# Patient Record
Sex: FEMALE | Race: BLACK OR AFRICAN AMERICAN | NOT HISPANIC OR LATINO | Employment: FULL TIME | URBAN - METROPOLITAN AREA
[De-identification: names, ages, dates, MRNs, and addresses within clinical notes are randomized per-mention and may not be internally consistent; named-entity substitution may affect disease eponyms.]

---

## 2021-06-03 ENCOUNTER — RECORDS - HEALTHEAST (OUTPATIENT)
Dept: ADMINISTRATIVE | Facility: CLINIC | Age: 36
End: 2021-06-03

## 2022-04-20 ENCOUNTER — APPOINTMENT (OUTPATIENT)
Dept: ULTRASOUND IMAGING | Facility: HOSPITAL | Age: 37
End: 2022-04-20
Attending: EMERGENCY MEDICINE
Payer: COMMERCIAL

## 2022-04-20 ENCOUNTER — HOSPITAL ENCOUNTER (EMERGENCY)
Facility: HOSPITAL | Age: 37
Discharge: HOME | End: 2022-04-20
Attending: EMERGENCY MEDICINE | Admitting: EMERGENCY MEDICINE
Payer: COMMERCIAL

## 2022-04-20 ENCOUNTER — APPOINTMENT (OUTPATIENT)
Dept: CT IMAGING | Facility: HOSPITAL | Age: 37
End: 2022-04-20
Attending: EMERGENCY MEDICINE
Payer: COMMERCIAL

## 2022-04-20 VITALS
DIASTOLIC BLOOD PRESSURE: 70 MMHG | WEIGHT: 293 LBS | HEART RATE: 74 BPM | HEIGHT: 68 IN | TEMPERATURE: 99.7 F | BODY MASS INDEX: 44.41 KG/M2 | SYSTOLIC BLOOD PRESSURE: 132 MMHG | RESPIRATION RATE: 18 BRPM | OXYGEN SATURATION: 100 %

## 2022-04-20 DIAGNOSIS — H53.8 BLURRED VISION: ICD-10-CM

## 2022-04-20 DIAGNOSIS — R51.9 ACUTE NONINTRACTABLE HEADACHE, UNSPECIFIED HEADACHE TYPE: ICD-10-CM

## 2022-04-20 LAB
ALBUMIN SERPL-MCNC: 3.6 G/DL (ref 3.5–5)
ALP SERPL-CCNC: 84 U/L (ref 45–120)
ALT SERPL W P-5'-P-CCNC: 13 U/L (ref 0–45)
ANION GAP SERPL CALCULATED.3IONS-SCNC: 7 MMOL/L (ref 5–18)
APTT PPP: 22 SECONDS (ref 22–38)
AST SERPL W P-5'-P-CCNC: 16 U/L (ref 0–40)
BILIRUB DIRECT SERPL-MCNC: 0.1 MG/DL
BILIRUB SERPL-MCNC: 0.2 MG/DL (ref 0–1)
BUN SERPL-MCNC: 8 MG/DL (ref 8–22)
CALCIUM SERPL-MCNC: 8.9 MG/DL (ref 8.5–10.5)
CHLORIDE BLD-SCNC: 103 MMOL/L (ref 98–107)
CO2 SERPL-SCNC: 27 MMOL/L (ref 22–31)
CREAT SERPL-MCNC: 0.75 MG/DL (ref 0.6–1.1)
ERYTHROCYTE [DISTWIDTH] IN BLOOD BY AUTOMATED COUNT: 14.7 % (ref 10–15)
GFR SERPL CREATININE-BSD FRML MDRD: >90 ML/MIN/1.73M2
GLUCOSE BLD-MCNC: 134 MG/DL (ref 70–125)
HCG SERPL QL: NEGATIVE
HCT VFR BLD AUTO: 38.3 % (ref 35–47)
HGB BLD-MCNC: 12.5 G/DL (ref 11.7–15.7)
INR PPP: 1.07 (ref 0.85–1.15)
MCH RBC QN AUTO: 29.7 PG (ref 26.5–33)
MCHC RBC AUTO-ENTMCNC: 32.6 G/DL (ref 31.5–36.5)
MCV RBC AUTO: 91 FL (ref 78–100)
PLATELET # BLD AUTO: 286 10E3/UL (ref 150–450)
POTASSIUM BLD-SCNC: 3.7 MMOL/L (ref 3.5–5)
PROT SERPL-MCNC: 7.2 G/DL (ref 6–8)
RBC # BLD AUTO: 4.21 10E6/UL (ref 3.8–5.2)
SODIUM SERPL-SCNC: 137 MMOL/L (ref 136–145)
WBC # BLD AUTO: 6.4 10E3/UL (ref 4–11)

## 2022-04-20 PROCEDURE — 85027 COMPLETE CBC AUTOMATED: CPT | Performed by: EMERGENCY MEDICINE

## 2022-04-20 PROCEDURE — 76856 US EXAM PELVIC COMPLETE: CPT

## 2022-04-20 PROCEDURE — 36415 COLL VENOUS BLD VENIPUNCTURE: CPT | Performed by: EMERGENCY MEDICINE

## 2022-04-20 PROCEDURE — 250N000013 HC RX MED GY IP 250 OP 250 PS 637: Performed by: EMERGENCY MEDICINE

## 2022-04-20 PROCEDURE — 99285 EMERGENCY DEPT VISIT HI MDM: CPT | Mod: 25

## 2022-04-20 PROCEDURE — 80053 COMPREHEN METABOLIC PANEL: CPT | Performed by: EMERGENCY MEDICINE

## 2022-04-20 PROCEDURE — 84703 CHORIONIC GONADOTROPIN ASSAY: CPT | Performed by: EMERGENCY MEDICINE

## 2022-04-20 PROCEDURE — 85610 PROTHROMBIN TIME: CPT | Performed by: EMERGENCY MEDICINE

## 2022-04-20 PROCEDURE — 82248 BILIRUBIN DIRECT: CPT | Performed by: EMERGENCY MEDICINE

## 2022-04-20 PROCEDURE — 70450 CT HEAD/BRAIN W/O DYE: CPT

## 2022-04-20 PROCEDURE — 93005 ELECTROCARDIOGRAM TRACING: CPT | Performed by: EMERGENCY MEDICINE

## 2022-04-20 PROCEDURE — 85730 THROMBOPLASTIN TIME PARTIAL: CPT | Performed by: EMERGENCY MEDICINE

## 2022-04-20 RX ORDER — ACETAMINOPHEN 325 MG/1
650 TABLET ORAL ONCE
Status: COMPLETED | OUTPATIENT
Start: 2022-04-20 | End: 2022-04-20

## 2022-04-20 RX ADMIN — ACETAMINOPHEN 650 MG: 325 TABLET ORAL at 22:30

## 2022-04-20 NOTE — ED PROVIDER NOTES
"ED Provider In Triage Note  [facility]  Encounter Date: Apr 20, 2022    Chief Complaint   Patient presents with     Headache/blurred vision/hypertension       Brief HPI:   Milady Andre is a 37 year old female presenting to the Emergency Department with a chief complaint of headaches with BL blurred vision associated with hypertension. She had intermittent headaches yesterday that worsened today. No nausea or vomiting. No chest pain or SOB.     She also reports heavy vaginal bleeding; she had to change her pad 3-4 times this morning before leaving for work. She reports irregular menses. Reports cramping pain, stating \"I feel like I'm carrying around a baby\".     Brief Physical Exam:  BP (!) 165/79 (BP Location: Left arm, Patient Position: Sitting)   Pulse 70   Temp 99.6  F (37.6  C) (Oral)   Resp 18   Ht 1.727 m (5' 8\")   Wt 140.6 kg (310 lb)   LMP  (LMP Unknown)   SpO2 100%   BMI 47.14 kg/m    General: Non-toxic appearing  HEENT: Atraumatic; PERRL; EOMI  Resp: No respiratory distress; clear lungs  Cardiac: RRR  Abdomen: soft, non-tender  Neuro: Alert, oriented, answers questions appropriately; cranial nerves grossly intact, strength 5/5 BL upper and lower extremities with sensation to light touch grossly intact  Psych: Behavior appropriate      Plan Initiated in Triage:  EKG  Blood work  Urine studies  Head CT  Pelvic ultrasound    PIT Dispo:   Room when able    Milagros Sanchez MD on 4/20/2022 at 6:48 PM    Patient was evaluated by the Physician in Triage due to a limitation of available rooms in the Emergency Department. A plan of care was discussed based on the information obtained on the initial evaluation and patient was consuled to return back to the Emergency Department lobby after this initial evalutaiton until results were obtained or a room became available in the Emergency Department. Patient was counseled not to leave prior to receiving the results of their " workup.        Milagros Sanchez MD  04/20/22 0422

## 2022-04-20 NOTE — ED TRIAGE NOTES
Patient comes to ED for evaluation of headache and intermittent blurred vision. Onset was yesterday but headache more severe today. Also stated was hypertensive at work and had had heavy vaginal bleeding. Irregular menses. Stated soake 4 pads this am before going to work.

## 2022-04-21 NOTE — ED NOTES
Pt. Reports checking her BP at work today. She states that she has never has issues w/elevated BP, nor was she ever prescribed any meds to control her BP. Pt. states that her periods are very irregular and she soaked 4 pads this morning after which she felt lightheaded and started to have a headache (4/10 currently).

## 2022-04-21 NOTE — DISCHARGE INSTRUCTIONS
You were seen today in the emergency department at [facility] for headache, blurred vision, abdominal pain, and heavy periods.  Your evaluation today looked generally stable and reassuring.  Your head CT looks good overall with no signs of bleeding or stroke.  Because of your headache, and blurred vision, 1 thing we were considering was a disease called idiopathic intracranial hypertension.  We are less convinced of this diagnosis based on your stable exam and improving symptoms and we recommended that you follow-up quickly and neurology clinic to continue your evaluation and review any ongoing symptoms.  If you have any severe or recurrent symptoms particularly sustained blurry vision, weakness or numbness in your arms or legs, difficulty walking, confusion, etc. we should reevaluate you right away in the emergency department.  Otherwise we placed an urgent referral so a  should be reaching out to you soon to help make the neurology clinic follow-up appointment.  Please also review this visit with your primary doctor and try to get your blood pressure rechecked in clinic within the next couple of weeks.

## 2022-04-21 NOTE — ED PROVIDER NOTES
EMERGENCY DEPARTMENT ENCOUNTER      NAME: Milady Andre  AGE: 37 year old female  YOB: 1985  MRN: [id number]  EVALUATION DATE & TIME: 4/20/2022 10:21 PM    PCP: No Ref-Primary, Physician    ED PROVIDER: Selvin Browne M.D.      Chief Complaint   Patient presents with     Headache/blurred vision/hypertension       FINAL IMPRESSION:  1. Acute nonintractable headache, unspecified headache type    2. Blurred vision        ED COURSE & MEDICAL DECISION MAKING:    10:35 PM I met with the patient, obtained history, performed an initial exam, and discussed options and plan for diagnostics and treatment here in the ED. PPE worn: N95 mask and gloves.   11:00 PM Results were communicated with the patient. Discussed discharge plans along with return precautions. Patient was agreeable with plan.        37 year old female presents to the Emergency Department for evaluation of headache, blurred vision, elevated blood pressure, abdominal pain, heavy periods.  She is vitally stable and well-appearing when she arrives to the emergency department.  Her full neurological exam is unremarkable.  Cardiopulmonary exam and abdominal exams are reassuring as well.  She had gradual onset of headache accompanied by some blurred vision earlier which is now improved.  Neurological exam is normal.  Head CT negative for acute intracranial process.  There was a partially empty sella.  Considered idiopathic intracranial hypertension.  I think given that her blurred vision is now completely resolved, her neurological exam is otherwise unremarkable, I do not have a high suspicion for this illness.  patient was not at all interested in pursuing a lumbar puncture here in the emergency department when this was discussed with her.  I think given her relatively mild headache currently rated 4 out of 10, resolved blurry vision, she could follow-up with any ongoing concerns with a urgent neurology referral and strict ED return  "precautions.    Patient's labs including hemoglobin, white blood cell count, metabolic profile all look stable and reassuring.  Pelvic ultrasound with slightly thickened endometrium but no other structural abnormalities to account for her heavier than usual periods.  Patient received some Tylenol for headache and abdominal pain here.  She declined anything stronger citing the need to drive home.  In addition to the neurology referral mentioned above, she was instructed to notify her primary clinic about this visit and can follow-up any additional concerns and have her recheck there to reassess her blood pressure.    At the conclusion of the encounter I discussed the results of all of the tests and the disposition. The questions were answered. The patient or family acknowledged understanding and was agreeable with the care plan.         MEDICATIONS GIVEN IN THE EMERGENCY:  Medications   acetaminophen (TYLENOL) tablet 650 mg (650 mg Oral Given 4/20/22 2230)       NEW PRESCRIPTIONS STARTED AT TODAY'S ER VISIT  Discharge Medication List as of 4/20/2022 11:25 PM             =================================================================    HPI    Patient information was obtained from: patient     Use of : N/A         Milady Andre is a 37 year old female with a pertinent history of PCOS, obesity, anxiety  who presents to this ED via walk in for evaluation of headache, blurred vision, hypertension.    Patient reports today she developed progressively worsening headache, lightheadness and abdominal pain. 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 She also is having heavy vaginal bleeding which is usual for her although all these other symptoms are not usually occurring all at once. Currently, her headache is 4/10 in severity. She is not currently prescribed any blood pressure medications since it hasn't been \"bad\". Otherwise no other medical " "complaints at this time.       REVIEW OF SYSTEMS   All systems reviewed and negative except as noted in HPI.    PAST MEDICAL HISTORY:  History reviewed. No pertinent past medical history.    PAST SURGICAL HISTORY:  Past Surgical History:   Procedure Laterality Date     BIOPSY CERVICAL, LOCAL EXCISION, SINGLE/MULTIPLE             CURRENT MEDICATIONS:    No current facility-administered medications for this encounter.     Current Outpatient Medications   Medication     oxyCODONE-acetaminophen (PERCOCET/ENDOCET) 5-325 mg per tablet         ALLERGIES:  No Known Allergies    FAMILY HISTORY:  History reviewed. No pertinent family history.    SOCIAL HISTORY:   Social History     Socioeconomic History     Marital status:    Tobacco Use     Smoking status: Never Smoker     Smokeless tobacco: Never Used   Substance and Sexual Activity     Alcohol use: Yes       VITALS:  /70   Pulse 74   Temp 99.7  F (37.6  C)   Resp 18   Ht 1.727 m (5' 8\")   Wt 140.6 kg (310 lb)   LMP  (LMP Unknown)   SpO2 100%   BMI 47.14 kg/m      PHYSICAL EXAM    Constitutional: Well developed, Well nourished, NAD.  HENT: Normocephalic, Atraumatic. Neck Supple.  Eyes: EOMI, Conjunctiva normal.  Respiratory: Breathing comfortably on room air. Speaks full sentences easily. Lungs clear to ascultation.  Cardiovascular: Normal heart rate, Regular rhythm. No peripheral edema.  Abdomen: Soft, nontender  Musculoskeletal: Good range of motion in all major joints. No major deformities noted.  Integument: Warm, Dry.  Neurologic: Fully awake, alert, oriented.  Face is symmetric.  Speech is normal.  Cranial nerves II through XII intact.  Strength is 5 out of 5 throughout bilateral upper and lower extremities.  Sensation to light touch intact x4.  Finger-nose-finger testing is normal.  Patient is ambulatory.    Psychiatric: Cooperative. Affect appropriate.     LAB:  All pertinent labs reviewed and interpreted.  Labs Ordered and Resulted from Time of " ED Arrival to Time of ED Departure   BASIC METABOLIC PANEL - Abnormal       Result Value    Sodium 137      Potassium 3.7      Chloride 103      Carbon Dioxide (CO2) 27      Anion Gap 7      Urea Nitrogen 8      Creatinine 0.75      Calcium 8.9      Glucose 134 (*)     GFR Estimate >90     CBC WITH PLATELETS - Normal    WBC Count 6.4      RBC Count 4.21      Hemoglobin 12.5      Hematocrit 38.3      MCV 91      MCH 29.7      MCHC 32.6      RDW 14.7      Platelet Count 286     HEPATIC FUNCTION PANEL - Normal    Bilirubin Total 0.2      Bilirubin Direct 0.1      Protein Total 7.2      Albumin 3.6      Alkaline Phosphatase 84      AST 16      ALT 13     INR - Normal    INR 1.07     PARTIAL THROMBOPLASTIN TIME - Normal    aPTT 22     HCG QUALITATIVE PREGNANCY - Normal    hCG Serum Qualitative Negative         RADIOLOGY:  Reviewed all pertinent imaging. Please see official radiology report.  US Pelvic Complete with Transvaginal   Final Result   IMPRESSION:   1.  Thickened endometrium, presumed physiologic. Patient has her period today with heavy vaginal bleeding.   2.  Exam otherwise unremarkable.               Head CT w/o contrast   Final Result   IMPRESSION:   1.  No CT findings of acute intracranial process.   2.  Partially empty appearance of the sella is nonspecific. This can be an incidental finding, but given the patient's history of headache and blurry vision, correlate clinically for the possibility of idiopathic intracranial hypertension.   3.  Partially visualized periapical lucencies of the maxillary teeth, concerning for periapical abscesses. Recommend follow-up dental consultation.          EKG:    Performed at: 1932    Impression: Sinus rhythm with sinus arrhythmia, normal ECG    Rate: 70  Rhythm: SInus  Axis: Normal  NM Interval: 182  QRS Interval: 102  QTc Interval: 453  ST Changes: None significant  Comparison: No change when compared to April 25, 2014    I have independently reviewed and interpreted  the EKG(s) documented above.        I, Irene Eli, am serving as a scribe to document services personally performed by Dr. Selvin Browne, based on my observation and the provider's statements to me. I, Selvin Browne MD attest that Irenemiroslava Medinag is acting in a scribe capacity, has observed my performance of the services and has documented them in accordance with my direction.    Selvin Browne M.D.  Emergency Medicine  [facility] EMERGENCY DEPARTMENT  [address] [place] [id number]  [phone]  Dept: [phone]     Selvin Browne MD  04/21/22 0045

## 2022-04-22 LAB
ATRIAL RATE - MUSE: 70 BPM
DIASTOLIC BLOOD PRESSURE - MUSE: NORMAL MMHG
INTERPRETATION ECG - MUSE: NORMAL
P AXIS - MUSE: 36 DEGREES
PR INTERVAL - MUSE: 182 MS
QRS DURATION - MUSE: 102 MS
QT - MUSE: 420 MS
QTC - MUSE: 453 MS
R AXIS - MUSE: 67 DEGREES
SYSTOLIC BLOOD PRESSURE - MUSE: NORMAL MMHG
T AXIS - MUSE: 52 DEGREES
VENTRICULAR RATE- MUSE: 70 BPM